# Patient Record
Sex: MALE | Race: WHITE
[De-identification: names, ages, dates, MRNs, and addresses within clinical notes are randomized per-mention and may not be internally consistent; named-entity substitution may affect disease eponyms.]

---

## 2018-02-22 ENCOUNTER — HOSPITAL ENCOUNTER (EMERGENCY)
Dept: HOSPITAL 24 - ER | Age: 78
Discharge: HOME | End: 2018-02-22
Payer: COMMERCIAL

## 2018-02-22 VITALS — SYSTOLIC BLOOD PRESSURE: 113 MMHG | DIASTOLIC BLOOD PRESSURE: 55 MMHG
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
## 2018-02-22 DIAGNOSIS — S63.502A: Primary | ICD-10-CM

## 2018-02-22 DIAGNOSIS — S63.92XA: ICD-10-CM

## 2018-02-22 PROCEDURE — 99283 EMERGENCY DEPT VISIT LOW MDM: CPT

## 2018-02-22 PROCEDURE — 29125 APPL SHORT ARM SPLINT STATIC: CPT

## 2018-02-22 PROCEDURE — 2W39X1Z IMMOBILIZATION OF LEFT UPPER EXTREMITY USING SPLINT: ICD-10-PCS | Performed by: INTERNAL MEDICINE

## 2018-02-22 PROCEDURE — 99282 EMERGENCY DEPT VISIT SF MDM: CPT

## 2018-02-22 PROCEDURE — 73100 X-RAY EXAM OF WRIST: CPT

## 2018-02-22 NOTE — RAD
HISTORY:  Left thumb pain status post exercise



Study:  Left wrist three views



Comparison: None







Findings:



There is no definite evidence for acute bone or acute joint abnormality. The carpal bones are intact 
and normally aligned. The distal radius and distal ulna are intact. There is a benign subchondral cys
t in the radial styloid. 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 No erosi
ve arthritis is present. Evident only on the lateral view is a suggestion of a is subluxation of the 
1st carpometacarpal joint this could be a real finding or could be positional. AP, lateral, and both 
oblique images of the entire left thumb are recommended.



IMPRESSION:

 

Possible subluxation of the 1st carpometacarpal joint. Recommend additional imaging as above



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







Reported By:Electronically Signed by EBONI HANNAH MD at 2/22/2018 2:15:34 PM

## 2018-02-22 NOTE — DR.GENAD
HPI





- PCP


Primary Care Physician: NARESH ESTRELLA USES THE [facility] Comment


HPI Comment: PATIENT DENIES ANY PUNCTURE WOUND.





- Complaint/Symptoms


Chief Complaint Doctors Comments: INJURY LEFT HAND AND WRIST. INJURED IT FEW 

DAYS AGO. SWELLING AND PAIN SINCE.


Chief Complaint:: PT C/O LEFT WRIST PAIN THAT HE INJURED ON SATURDAY .. PT HAS 

ACE BANDAGE AND A HOME MADE SPLINT PT HAS NO DEFORMITY NOTED AND SOME EDEMA.


Self Treatment fo Chief Complaint: PT C/O RIGHT ANKLE FROM FALLING OFF A RAMP 

AT HOME.





- Nurses notes reviewed


Nurses Notes Review: Yes





- Source


History Provided: Patient





- Mode of Arrival


Mode of Arrival: Ambulatory





- Timing


Onset of Chief Complaint: 02/17/18


Came on: Suddenly





- Duration


Duration: Constant


Duration: Days





- Severity


Severity: Moderate





PMH





- PMH


Past Medical History: Yes


Past Medical History Comment: PE, DVT


Past Surgical History: No





- Family History


History of Family Medical Conditions: No





- Social History


Does patient currently use any type of tobacco product: No


Have you used tobacco products in the last 12 months: No


Type of Tobacco Use: None


Does any household member use tobacco: No


Alcohol Use: None


Do you use any recreational Drugs:: No


Lives With: Family


Lives Where: Home





- infectious screening


In the last 2 months have you had wt loss of >10#?: NO


Have you had fever, night sweats or hemotysis?: No


Have you traveled outside the country in the last 6 months?: No


Isolation: Standard





ROS





- Review of Systems


Constitutional: No Symptoms Reported


Eyes: No Symptoms Reported


ENTM: No Symptoms Reported


Respiratoy: No Symptoms Reported


Cardiovascular: No Symptoms Reported


Gastrointestinal/Abdominal: No Symptoms Reported


Genitourinary: No Symptoms Reported


Neurological: No Symptoms Reported


Musculoskeletal: Left, Wrist, Hand


Integumentary: Bruises


Hematologic/Lymphatic: No Symptoms Reported


Endocrine: No Symptoms Reported


All Other Systems: Reviewed and Negative





PE





- Vital Signs


Vitals: 


 





Temperature                      98.1 F


Pulse Rate                       72


Respiratory Rate                 20


Blood Pressure                   113/55


O2 Sat by Pulse Oximetry         95











- General


Limitations: No Limitations


General Appearance: Alert





- Head


Head Exam: Normal Inspection





- Eyes


Eye exam: Normal Appearance





- ENT


ENT Exam: Normal  External Ear Exam


External Ear Exam: Normal External Inspection


TM/Canal Exam: Bilateral Normal


Nose Exam: Normal Nose Exam


Mouth Exam: Normal Inspection


Throat Exam: Normal Inspection





- Neck


Neck Exam: Trachea Midline





- Chest


Chest Inspection: Symmetric Chest Wall Rise





- Respiratory


Respiratory Exam: Normal Lung Sounds Bilat


Respiratory Exam: Bilateral Clear to Auscultation





- Cardiovascular


Cardiovascular Exam: Regular Rate, Normal Rhythm, Normal Heart Sounds





- Abdominal Exam


Abdominal Exam: Normal Bowel Sounds, Soft.  negative: Tenderness





- Extremities


Extremities Exam: Tenderness (SWELLING LEFT HAND WITH TENDERNESS.), Joint 

Swelling (LT WRIST SWELLING WITH TENDERNESS.)





- Back


Back Exam: Normal Inspection





- Neurologic


Neurological Exam: Alert, Oriented X3





- Psychiatric


Psychiatric Exam: Normal Affect, Normal Mood





- Skin


Skin Exam: Normal Color





MDM





- Differential Diagnosis


Differential Diagnosis: LEFT WRIST AND HAND CONTUSION, SPRAIN OR FRACTURE.





Course





- Treatment


Treatment: SEE ORDERS.





- Education/Counseling


Education/Counseling: Patient, Education


Educated On: Diagnosis, Needs for Follow Up





ROR





- XRAY


XRAY Findings: REPORT DISCUSS WITH PATIENT.





- Diagnosis


Discharge Problem: 


Left wrist sprain


Qualifiers:


 Encounter type: initial encounter Qualified Code(s): S63.502A - Unspecified 

sprain of left wrist, initial encounter





Sprain of hand, left


Qualifiers:


 Encounter type: initial encounter Qualified Code(s): S63.92XA - Sprain of 

unspecified part of left wrist and hand, initial encounter








- Discharge Plan


Disposition: 01 HOME, SELF-CARE


Condition: Stable





- Follow ups/Referrals


Follow ups/Referrals: 


DELORES,None [Primary Care Provider] - 02/23/18


OWEN JI [STAFF PHYSICIAN] - 02/23/18





- Instructions


Instructions:  Intermetacarpal Sprain, Wrist Sprain


Additional Instructions: 


PT IS TO FLU WITH [facility] IN THE AM IN THE [place] OFFICE [phone], 

ADDRESS [address] [place] [address] , PT IS TO BRING ID, MEDICATION LISTS, 

INS CARD , AND BE THERE 30 MIN PRIOR TO MY APPT,,BR